# Patient Record
Sex: MALE | NOT HISPANIC OR LATINO | Employment: FULL TIME | URBAN - METROPOLITAN AREA
[De-identification: names, ages, dates, MRNs, and addresses within clinical notes are randomized per-mention and may not be internally consistent; named-entity substitution may affect disease eponyms.]

---

## 2021-07-09 ENCOUNTER — OFFICE VISIT - RIVER FALLS (OUTPATIENT)
Dept: FAMILY MEDICINE | Facility: CLINIC | Age: 41
End: 2021-07-09

## 2022-02-16 NOTE — NURSING NOTE
"Comprehensive Intake Entered On:  7/9/2021 1:34 PM CDT    Performed On:  7/9/2021 1:26 PM CDT by Nilay Alcantar CMA               Summary   Chief Complaint :   Verbal consent given for telephone visit.  New Pt is c/o itchy,watery eyes,itchy ears,dry cough,sore throat and SOB \"from coughing\" x 2 weeks   Nilay Alcantar CMA - 7/9/2021 1:26 PM CDT   Health Status   Allergies Verified? :   Yes   Medication History Verified? :   Yes   Medical History Verified? :   Yes   Pre-Visit Planning Status :   Not completed   Tobacco Use? :   Former smoker   Nilay Alacntar CMA - 7/9/2021 1:26 PM CDT   Meds / Allergies   (As Of: 7/9/2021 1:34:04 PM CDT)   Allergies (Active)   guaiFENesin  Estimated Onset Date:   Unspecified ; Reactions:   Hives ; Created By:   Nilay Alcantar CMA; Reaction Status:   Active ; Category:   Drug ; Substance:   guaiFENesin ; Type:   Allergy ; Updated By:   Nilay Alcantar CMA; Reviewed Date:   7/9/2021 1:33 PM CDT        Medication List   (As Of: 7/9/2021 1:34:04 PM CDT)        Past Medical History   Past Medical History   (As Of: 7/9/2021 1:34:04 PM CDT)     Procedures / Surgeries        -    Procedure History   (As Of: 7/9/2021 1:34:04 PM CDT)     Anesthesia Minutes:   0 ; Procedure Name:   No previous procedures ; Procedure Minutes:   0 ; Last Reviewed Dt/Tm:   7/9/2021 1:30:01 PM CDT            Family History   Family History   (As Of: 7/9/2021 1:34:04 PM CDT)   Negative History     Social History   Social History   (As Of: 7/9/2021 1:34:04 PM CDT)   Tobacco:        Former smoker, quit more than 30 days ago   Comments:  7/9/2021 1:29 PM - Nilay Alcantar CMA: pt quit 2012   (Last Updated: 7/9/2021 1:29:34 PM CDT by Nilay Alcantar CMA)          Electronic Cigarette/Vaping:        Electronic Cigarette Use: Never.   (Last Updated: 7/9/2021 1:29:43 PM CDT by Nilay Alcantar CMA)  "

## 2022-02-16 NOTE — PROGRESS NOTES
"   Patient:   MARIA ESTHER TRIMBLE            MRN: [id number]            FIN: [id number]               Age:   41 years     Sex:  Male     :  1980   Associated Diagnoses:   Seasonal allergies; Cough variant asthma   Author:   Da PAYNE, Barrie CLEMENTE      Visit Information      Date of Service: 2021 08:03 am  Performing Location: [facility]  Encounter#: [id number]      Primary Care Provider (PCP):  NONE ,    Visit type:  Telephone Encounter.    Source of history:  Patient.    Location of patient:  _home  Call Start Time:   _1344  Call End Time:    _1354      Chief Complaint   2021 1:26 PM CDT     Verbal consent given for telephone visit.  New Pt is c/o itchy,watery eyes,itchy ears,dry cough,sore throat and SOB \"from coughing\" x 2 weeks     _      History of Present Illness   Today's visit was conducted via telephone due to the COVID-19 pandemic. Patient's consent to telephone visit was obtained and documented.      Reason for visit:  _  2 week hx of itchy, watery eyes, itchy ears, dry cough, sore throat, SOB  3 weeks ago his mother passed away  in the past he has taken zyrtec and singulair  he is taking claritin and that is not working  well  he says he has cough variant asthma and has used albuterol inhalers in the past  also had been on Dulera  he was discharged from the  about a year ago and has not been seen by a doctor since then    he has been vaccinated for Covid about 3 months ago         Review of Systems   Eye:       Discharge: Bilateral.    Ear/Nose/Mouth/Throat:  Nasal congestion.    Respiratory:  Cough, No sputum production.       Impression and Plan   Diagnosis     Seasonal allergies (ROT46-XE J30.2).     Cough variant asthma (PRO41-FM J45.991).     Summary:  will renew his zyrtec, singulair, and albuterol MDI for his allergies and cough.  He also asks for phenergan/codeine cough syrup but I decline that  request today.  Follow up as needed.    Orders     Orders "   Pharmacy:  Proventil HFA 90 mcg/inh inhalation aerosol (Prescribe): 2 puff(s), Inhale, q4 hrs, Instructions: prn SOB/ 30 min before exercise, # 1 EA, 3 Refill(s), Type: Maintenance, Pharmacy: [facility] #[id number], 2 puff(s) Inhale q4 hrs,Instr:prn SOB/ 30 min before exercise  Singulair 10 mg oral tablet (Prescribe): = 1 tab(s) ( 10 mg ), Oral, qpm, # 30 tab(s), 3 Refill(s), Type: Maintenance, Pharmacy: [facility] #[id number], 1 tab(s) Oral qpm  cetirizine 10 mg oral tablet (Prescribe): = 1 tab(s) ( 10 mg ), Oral, daily, # 30 tab(s), 5 Refill(s), Type: Maintenance, Pharmacy: [facility] #[id number], 1 tab(s) Oral daily.     Orders   Charges (Evaluation and Management):  [id number] office o/p new low 30-44 min (Charge) (Order): Quantity: 1, Seasonal allergies  Cough variant asthma.        Health Status   Allergies:    Allergic Reactions (Selected)  Severity Not Documented  GuaiFENesin (Hives)   Medications:  (Selected)      Problem list:    No problem items selected or recorded.      Histories   Past Medical History:    No active or resolved past medical history items have been selected or recorded.   Family History:    Entire family history is negative.   Procedure history:    No previous procedures.   Social History:        Electronic Cigarette/Vaping Assessment            Electronic Cigarette Use: Never.      Tobacco Assessment            Former smoker, quit more than 30 days ago                     Comments:                      07/09/2021 - Nilay Alcantar CMA                     pt quit 2012

## 2022-03-17 RX ORDER — ALBUTEROL SULFATE 90 UG/1
AEROSOL, METERED RESPIRATORY (INHALATION)
COMMUNITY
Start: 2021-07-09 | End: 2022-03-18

## 2022-03-17 RX ORDER — MONTELUKAST SODIUM 10 MG/1
10 TABLET ORAL AT BEDTIME
COMMUNITY
Start: 2021-07-09 | End: 2022-03-18

## 2022-03-17 RX ORDER — CETIRIZINE HYDROCHLORIDE 10 MG/1
10 TABLET ORAL DAILY
COMMUNITY
Start: 2021-07-09 | End: 2022-11-29

## 2022-03-18 ENCOUNTER — VIRTUAL VISIT (OUTPATIENT)
Dept: FAMILY MEDICINE | Facility: CLINIC | Age: 42
End: 2022-03-18

## 2022-03-18 DIAGNOSIS — J30.2 SEASONAL ALLERGIC RHINITIS, UNSPECIFIED TRIGGER: Primary | ICD-10-CM

## 2022-03-18 DIAGNOSIS — J45.41 MODERATE PERSISTENT ASTHMA WITH ACUTE EXACERBATION: ICD-10-CM

## 2022-03-18 PROCEDURE — 99214 OFFICE O/P EST MOD 30 MIN: CPT | Mod: 95 | Performed by: FAMILY MEDICINE

## 2022-03-18 RX ORDER — PREDNISONE 10 MG/1
TABLET ORAL
Qty: 30 TABLET | Refills: 0 | Status: SHIPPED | OUTPATIENT
Start: 2022-03-18 | End: 2022-03-30

## 2022-03-18 RX ORDER — MONTELUKAST SODIUM 10 MG/1
10 TABLET ORAL AT BEDTIME
Qty: 30 TABLET | Refills: 1 | Status: SHIPPED | OUTPATIENT
Start: 2022-03-18 | End: 2022-11-29

## 2022-03-18 RX ORDER — PROMETHAZINE HYDROCHLORIDE AND CODEINE PHOSPHATE 6.25; 1 MG/5ML; MG/5ML
5 SYRUP ORAL 4 TIMES DAILY PRN
COMMUNITY
End: 2023-03-07

## 2022-03-18 RX ORDER — ALBUTEROL SULFATE 90 UG/1
AEROSOL, METERED RESPIRATORY (INHALATION)
Qty: 18 G | Refills: 1 | Status: SHIPPED | OUTPATIENT
Start: 2022-03-18 | End: 2022-11-29

## 2022-03-18 ASSESSMENT — ENCOUNTER SYMPTOMS: WHEEZING: 1

## 2022-03-18 NOTE — PROGRESS NOTES
Otoniel is a 41 year old who is being evaluated via a billable video visit.      How would you like to obtain your AVS? Mail a copy  If the video visit is dropped, the invitation should be resent by: Text to cell phone: [phone] anyone else be joining your video visit? No    Video Start Time: 0915    Assessment & Plan     Seasonal allergic rhinitis, unspecified trigger  Triggered by brother's dogs  - albuterol (PROVENTIL HFA) 108 (90 Base) MCG/ACT inhaler; 2 puff(s), Inhale, q4 hrs, Instructions: prn SOB/ 30 min before exercise, # 1 EA, 3 Refill(s), Type: Maintenance, Pharmacy: [facility] #[id number], 2 puff(s) Inhale q4 hrs,Instr:prn SOB/ 30 min before exercise  - mometasone-formoterol (DULERA) 200-5 MCG/ACT inhaler; Inhale 2 puffs into the lungs 2 times daily  - montelukast (SINGULAIR) 10 MG tablet; Take 1 tablet (10 mg) by mouth At Bedtime  - predniSONE (DELTASONE) 10 MG tablet; Take 4 tablets (40 mg) by mouth daily for 3 days, THEN 3 tablets (30 mg) daily for 3 days, THEN 2 tablets (20 mg) daily for 3 days, THEN 1 tablet (10 mg) daily for 3 days.    Moderate persistent asthma with acute exacerbation  Also visiting brother in new environment.  - albuterol (PROVENTIL HFA) 108 (90 Base) MCG/ACT inhaler; 2 puff(s), Inhale, q4 hrs, Instructions: prn SOB/ 30 min before exercise, # 1 EA, 3 Refill(s), Type: Maintenance, Pharmacy: [facility] #[id number], 2 puff(s) Inhale q4 hrs,Instr:prn SOB/ 30 min before exercise  - mometasone-formoterol (DULERA) 200-5 MCG/ACT inhaler; Inhale 2 puffs into the lungs 2 times daily  - montelukast (SINGULAIR) 10 MG tablet; Take 1 tablet (10 mg) by mouth At Bedtime  - predniSONE (DELTASONE) 10 MG tablet; Take 4 tablets (40 mg) by mouth daily for 3 days, THEN 3 tablets (30 mg) daily for 3 days, THEN 2 tablets (20 mg) daily for 3 days, THEN 1 tablet (10 mg) daily for 3 days.                 Follow-up if not improving    Jose Bob MD  [facility]  BECKY Frederick is a 41 year old who presents for the following health issues: Seasonal allergies    Allergies    History of Present Illness       Reason for visit:  Allergies  Symptom onset:  3-7 days ago  Symptom intensity:  Moderate  Symptom progression:  Staying the same  Had these symptoms before:  Yes  What makes it better:  Medications    He eats 2-3 servings of fruits and vegetables daily.He consumes 2 sweetened beverage(s) daily.He exercises with enough effort to increase his heart rate 30 to 60 minutes per day.  He exercises with enough effort to increase his heart rate 3 or less days per week. He is missing 6 dose(s) of medications per week.  He is not taking prescribed medications regularly due to other.       Asthma Follow-Up    Was ACT completed today?  No      Do you have a cough?  YES    Are you experiencing any wheezing in your chest?  YES    Do you have any shortness of breath?  No     How often are you using a short-acting (rescue) inhaler or nebulizer, such as Albuterol?  Daily    How many days per week do you miss taking your asthma controller medication?  0    Please describe any recent triggers for your asthma: Dog hair    Have you had any Emergency Room Visits, Urgent Care Visits, or Hospital Admissions since your last office visit?  No        Review of Systems   Respiratory: Positive for wheezing.       CONSTITUTIONAL: NEGATIVE for fever, chills, change in weight  ENT/MOUTH: NEGATIVE for ear, mouth and throat problems  RESP:NEGATIVE for significant cough or SOB and POSITIVE for cough-non productive and exposure to allergen  CV: NEGATIVE for chest pain, palpitations or peripheral edema      Objective           Vitals:  No vitals were obtained today due to virtual visit.    Physical Exam   GENERAL: Healthy, alert and no distress  EYES: Eyes grossly normal to inspection.  No discharge or erythema, or obvious scleral/conjunctival abnormalities.  RESP: No audible wheeze, cough, or  visible cyanosis.  No visible retractions or increased work of breathing.    SKIN: Visible skin clear. No significant rash, abnormal pigmentation or lesions.  NEURO: Cranial nerves grossly intact.  Mentation and speech appropriate for age.  PSYCH: Mentation appears normal, affect normal/bright, judgement and insight intact, normal speech and appearance well-groomed.              Video-Visit Details    Type of service:  Video Visit    Video End Time:0935     Originating Location (pt. Location): Other Brothers home    Distant Location (provider location):  [facility]     Platform used for Video Visit: Blayne

## 2022-11-29 ENCOUNTER — VIRTUAL VISIT (OUTPATIENT)
Dept: FAMILY MEDICINE | Facility: CLINIC | Age: 42
End: 2022-11-29

## 2022-11-29 DIAGNOSIS — J45.41 MODERATE PERSISTENT ASTHMA WITH ACUTE EXACERBATION: ICD-10-CM

## 2022-11-29 DIAGNOSIS — J30.2 SEASONAL ALLERGIC RHINITIS, UNSPECIFIED TRIGGER: ICD-10-CM

## 2022-11-29 PROCEDURE — 99214 OFFICE O/P EST MOD 30 MIN: CPT | Mod: 95 | Performed by: FAMILY MEDICINE

## 2022-11-29 RX ORDER — CETIRIZINE HYDROCHLORIDE 10 MG/1
10 TABLET ORAL DAILY
Qty: 30 TABLET | Refills: 1 | Status: SHIPPED | OUTPATIENT
Start: 2022-11-29

## 2022-11-29 RX ORDER — MONTELUKAST SODIUM 10 MG/1
10 TABLET ORAL AT BEDTIME
Qty: 90 TABLET | Refills: 1 | Status: SHIPPED | OUTPATIENT
Start: 2022-11-29 | End: 2023-03-07

## 2022-11-29 RX ORDER — ALBUTEROL SULFATE 90 UG/1
AEROSOL, METERED RESPIRATORY (INHALATION)
Qty: 18 G | Refills: 1 | Status: SHIPPED | OUTPATIENT
Start: 2022-11-29 | End: 2023-03-07

## 2022-11-29 RX ORDER — FLUTICASONE PROPIONATE AND SALMETEROL 250; 50 UG/1; UG/1
1 POWDER RESPIRATORY (INHALATION) EVERY 12 HOURS
COMMUNITY

## 2022-11-29 RX ORDER — FLUTICASONE PROPIONATE 50 MCG
1 SPRAY, SUSPENSION (ML) NASAL DAILY
Qty: 18.2 ML | Refills: 3 | Status: SHIPPED | OUTPATIENT
Start: 2022-11-29

## 2022-11-29 NOTE — PROGRESS NOTES
Otoniel is a 42 year old who is being evaluated via a billable telephone visit.      What phone number would you like to be contacted at? [phone]  How would you like to obtain your AVS? Mail a copy    Assessment & Plan     Seasonal allergic rhinitis, unspecified trigger  Patient with seasonal allergies with cough.  Renewed his medications.  We added Flonase.  We declined refilling his Phenergan with codeine.  We told him we would not refill that he hung up.  - albuterol (PROVENTIL HFA) 108 (90 Base) MCG/ACT inhaler; 2 puff(s), Inhale, q4 hrs, Instructions: prn SOB/ 30 min before exercise, # 1 EA, 3 Refill(s), Type: Maintenance, Pharmacy: [facility] #[id number], 2 puff(s) Inhale q4 hrs,Instr:prn SOB/ 30 min before exercise  - montelukast (SINGULAIR) 10 MG tablet; Take 1 tablet (10 mg) by mouth At Bedtime  - cetirizine (ZYRTEC) 10 MG tablet; Take 1 tablet (10 mg) by mouth daily  - fluticasone (FLONASE) 50 MCG/ACT nasal spray; Spray 1 spray into both nostrils daily    Moderate persistent asthma with acute exacerbation    - albuterol (PROVENTIL HFA) 108 (90 Base) MCG/ACT inhaler; 2 puff(s), Inhale, q4 hrs, Instructions: prn SOB/ 30 min before exercise, # 1 EA, 3 Refill(s), Type: Maintenance, Pharmacy: [facility] #[id number], 2 puff(s) Inhale q4 hrs,Instr:prn SOB/ 30 min before exercise  - montelukast (SINGULAIR) 10 MG tablet; Take 1 tablet (10 mg) by mouth At Bedtime                 No follow-ups on file.    Montana Gaytan MD  [facility]    Subjective   Otoniel is a 42 year old, presenting for the following health issues: Patient struggles with his allergies.  He notes with weather changes being around animals at times his head congestion sneezing and cough are all worse at times.  He needs meds renewed as he is out.  Allergies      HPI     Discuss allergies and needing refills of albuterol, montelukast, and cough syrup.         Review of Systems         Objective            Vitals:  No vitals were obtained today due to virtual visit.    Physical Exam   healthy, alert and no distress  PSYCH: Alert and oriented times 3; coherent speech, normal   rate and volume, able to articulate logical thoughts, able   to abstract reason, no tangential thoughts, no hallucinations   or delusions  His affect is normal  RESP: No cough, no audible wheezing, able to talk in full sentences  Remainder of exam unable to be completed due to telephone visits                Phone call duration: 5 minutes

## 2023-03-07 ENCOUNTER — VIRTUAL VISIT (OUTPATIENT)
Dept: FAMILY MEDICINE | Facility: CLINIC | Age: 43
End: 2023-03-07

## 2023-03-07 DIAGNOSIS — J30.2 SEASONAL ALLERGIC RHINITIS, UNSPECIFIED TRIGGER: Primary | ICD-10-CM

## 2023-03-07 DIAGNOSIS — J45.41 MODERATE PERSISTENT ASTHMA WITH ACUTE EXACERBATION: ICD-10-CM

## 2023-03-07 PROBLEM — J45.991 COUGH VARIANT ASTHMA: Status: ACTIVE | Noted: 2023-03-07

## 2023-03-07 PROCEDURE — 99213 OFFICE O/P EST LOW 20 MIN: CPT | Mod: VID | Performed by: PHYSICIAN ASSISTANT

## 2023-03-07 RX ORDER — ALBUTEROL SULFATE 90 UG/1
AEROSOL, METERED RESPIRATORY (INHALATION)
Qty: 18 G | Refills: 4 | Status: SHIPPED | OUTPATIENT
Start: 2023-03-07

## 2023-03-07 RX ORDER — PREDNISONE 20 MG/1
40 TABLET ORAL DAILY
Qty: 10 TABLET | Refills: 0 | Status: SHIPPED | OUTPATIENT
Start: 2023-03-07 | End: 2023-03-12

## 2023-03-07 RX ORDER — MONTELUKAST SODIUM 10 MG/1
10 TABLET ORAL AT BEDTIME
Qty: 90 TABLET | Refills: 1 | Status: SHIPPED | OUTPATIENT
Start: 2023-03-07

## 2023-03-07 RX ORDER — PROMETHAZINE HYDROCHLORIDE AND PHENYLEPHRINE HYDROCHLORIDE 6.25; 5 MG/5ML; MG/5ML
5 SYRUP ORAL 4 TIMES DAILY PRN
Qty: 180 ML | Refills: 1 | Status: SHIPPED | OUTPATIENT
Start: 2023-03-07

## 2023-03-07 ASSESSMENT — ASTHMA QUESTIONNAIRES: ACT_TOTALSCORE: 17

## 2023-03-07 ASSESSMENT — ENCOUNTER SYMPTOMS
COUGH: 1
EYE ITCHING: 1

## 2023-03-07 NOTE — PROGRESS NOTES
Otoniel is a 42 year old who is being evaluated via a billable video visit.      How would you like to obtain your AVS? none  If the video visit is dropped, the invitation should be resent by: Text to cell phone: [phone]  Will anyone else be joining your video visit? No          1. Seasonal allergic rhinitis, unspecified trigger  Will refill his albuterol MDI, montelukast  - albuterol (PROVENTIL HFA) 108 (90 Base) MCG/ACT inhaler; 2 puff(s), Inhale, q4 hrs, Instructions: prn SOB/ 30 min before exercise, # 1 EA, 3 Refill(s), Type: Maintenance, Pharmacy: [facility] #[id number], 2 puff(s) Inhale q4 hrs,Instr:prn SOB/ 30 min before exercise  Dispense: 18 g; Refill: 4  - montelukast (SINGULAIR) 10 MG tablet; Take 1 tablet (10 mg) by mouth At Bedtime  Dispense: 90 tablet; Refill: 1  - predniSONE (DELTASONE) 20 MG tablet; Take 2 tablets (40 mg) by mouth daily for 5 days  Dispense: 10 tablet; Refill: 0    2. Moderate persistent asthma with acute exacerbation  Will refill his albuterol, montelukast,   Will add burst of prednisone  And per his request will give him some promethazine/phenylephrine syrup to help with cough and nausea    - albuterol (PROVENTIL HFA) 108 (90 Base) MCG/ACT inhaler; 2 puff(s), Inhale, q4 hrs, Instructions: prn SOB/ 30 min before exercise, # 1 EA, 3 Refill(s), Type: Maintenance, Pharmacy: [facility] #[id number], 2 puff(s) Inhale q4 hrs,Instr:prn SOB/ 30 min before exercise  Dispense: 18 g; Refill: 4  - montelukast (SINGULAIR) 10 MG tablet; Take 1 tablet (10 mg) by mouth At Bedtime  Dispense: 90 tablet; Refill: 1  - predniSONE (DELTASONE) 20 MG tablet; Take 2 tablets (40 mg) by mouth daily for 5 days  Dispense: 10 tablet; Refill: 0  - promethazine-phenylephrine (PROMETHAZINE VC) 6.25-5 MG/5ML syrup; Take 5 mLs by mouth 4 times daily as needed (cough, nausea)  Dispense: 180 mL; Refill: 1      Subjective   Otoniel is a 42 year old accompanied by his self, presenting for the following health  "issues:  Cough and Asthma (Pt needing refills on his albuterol inhaler states he has been out \"for months\"  Pt also c/o cough,watery,itchy eye and ears x \"couple of days\"  )            Cough       He has been exposed dust and dog dander at work sites and his asthma and allergies are flairing up    He has been out of his albuterol   He is taking Advair, flonase (he does not need refills on those)  But is out of albuterol, singulair  Asthma Follow-Up    Was ACT completed today?    Yes    ACT Total Scores 3/7/2023   ACT TOTAL SCORE (Goal Greater than or Equal to 20) 17   In the past 12 months, how many times did you visit the emergency room for your asthma without being admitted to the hospital? 0   In the past 12 months, how many times were you hospitalized overnight because of your asthma? 0         How many days per week do you miss taking your asthma controller medication?  I do not have an asthma controller medication    Please describe any recent triggers for your asthma: smoke, dust mites and animal dander    Have you had any Emergency Room Visits, Urgent Care Visits, or Hospital Admissions since your last office visit?  No        Review of Systems   Eyes: Positive for itching.   Respiratory: Positive for cough.             Objective           Vitals:  No vitals were obtained today due to virtual visit.    Physical Exam   GENERAL: Healthy, alert and no distress  EYES: Eyes grossly normal to inspection.  No discharge or erythema, or obvious scleral/conjunctival abnormalities.  RESP: No audible wheeze, cough, or visible cyanosis.  No visible retractions or increased work of breathing.    SKIN: Visible skin clear. No significant rash, abnormal pigmentation or lesions.  NEURO: Cranial nerves grossly intact.  Mentation and speech appropriate for age.  PSYCH: Mentation appears normal, affect normal/bright, judgement and insight intact, normal speech and appearance well-groomed.                Video-Visit " Details    Type of service:  Video Visit     Originating Location (pt. Location): Other work site    Distant Location (provider location):  On-site  Platform used for Video Visit: Blayne